# Patient Record
Sex: FEMALE | Race: WHITE | NOT HISPANIC OR LATINO | Employment: UNEMPLOYED | URBAN - METROPOLITAN AREA
[De-identification: names, ages, dates, MRNs, and addresses within clinical notes are randomized per-mention and may not be internally consistent; named-entity substitution may affect disease eponyms.]

---

## 2017-11-13 ENCOUNTER — DOCUMENTATION ONLY (OUTPATIENT)
Dept: FAMILY MEDICINE | Facility: CLINIC | Age: 37
End: 2017-11-13

## 2017-11-13 NOTE — PROGRESS NOTES
Health Maintenance Due   Topic Date Due    Lipid Panel  1982    TETANUS VACCINE  05/05/2000    Pap Smear with HPV Cotest  05/05/2003    Influenza Vaccine  08/01/2017